# Patient Record
Sex: FEMALE | Race: WHITE | NOT HISPANIC OR LATINO
[De-identification: names, ages, dates, MRNs, and addresses within clinical notes are randomized per-mention and may not be internally consistent; named-entity substitution may affect disease eponyms.]

---

## 2019-05-21 ENCOUNTER — RESULT REVIEW (OUTPATIENT)
Age: 19
End: 2019-05-21

## 2021-01-18 ENCOUNTER — RESULT REVIEW (OUTPATIENT)
Age: 21
End: 2021-01-18

## 2022-06-16 ENCOUNTER — RESULT REVIEW (OUTPATIENT)
Age: 22
End: 2022-06-16

## 2022-10-10 PROBLEM — Z00.00 ENCOUNTER FOR PREVENTIVE HEALTH EXAMINATION: Status: ACTIVE | Noted: 2022-10-10

## 2022-11-24 ENCOUNTER — APPOINTMENT (OUTPATIENT)
Dept: ORTHOPEDIC SURGERY | Facility: CLINIC | Age: 22
End: 2022-11-24

## 2022-11-24 DIAGNOSIS — M67.441 GANGLION, RIGHT HAND: ICD-10-CM

## 2022-11-24 PROCEDURE — 73140 X-RAY EXAM OF FINGER(S): CPT | Mod: RT

## 2022-11-24 PROCEDURE — 99213 OFFICE O/P EST LOW 20 MIN: CPT

## 2022-11-24 NOTE — IMAGING
[de-identified] : RIGHT HAND\par cyst over MF volar base, transilluminates, non-TTP.\par skin intact. no swelling.\par no TTP.\par good wrist extension, flexion. good pronation, supination.\par good EPL, FPL. good finger extension, flex to full fist. good finger abduction and adduction. \par SILT to median, ulnar, radial distribution. \par brisk cap refill all digits.\par no triggering.\par \par XRAY RIGHT MIDDLE FINGER: no acute displaced fracture or dislocation.

## 2022-11-24 NOTE — ASSESSMENT
[FreeTextEntry1] : The condition was explained to the patient.\par Discussed risks and benefits of treatment options for ganglion cyst - observation, NSAID, brace, aspiration, or surgery. Patient would like to continue observation at this time.\par - recommend padded glove for gripping activities.\par - recommend oral NSAID, activity modification PRN.\par \par F/u PRN.